# Patient Record
Sex: MALE | Race: WHITE | Employment: FULL TIME | URBAN - METROPOLITAN AREA
[De-identification: names, ages, dates, MRNs, and addresses within clinical notes are randomized per-mention and may not be internally consistent; named-entity substitution may affect disease eponyms.]

---

## 2017-12-08 PROBLEM — E66.01 OBESITY, MORBID (HCC): Status: ACTIVE | Noted: 2017-12-08

## 2018-02-20 ENCOUNTER — HOSPITAL ENCOUNTER (OUTPATIENT)
Dept: LAB | Age: 58
Discharge: HOME | End: 2018-02-20

## 2018-02-20 PROCEDURE — 88305 TISSUE EXAM BY PATHOLOGIST: CPT | Performed by: INTERNAL MEDICINE

## 2018-02-20 PROCEDURE — 88312 SPECIAL STAINS GROUP 1: CPT | Performed by: INTERNAL MEDICINE

## 2020-03-18 ENCOUNTER — HOSPITAL ENCOUNTER (OUTPATIENT)
Dept: PHYSICAL THERAPY | Age: 60
Discharge: HOME | End: 2020-03-18
Attending: INTERNAL MEDICINE
Payer: COMMERCIAL

## 2020-03-18 DIAGNOSIS — M25.551 RIGHT HIP PAIN: ICD-10-CM

## 2020-03-18 PROCEDURE — 97161 PT EVAL LOW COMPLEX 20 MIN: CPT

## 2020-03-18 PROCEDURE — 97110 THERAPEUTIC EXERCISES: CPT

## 2020-03-18 NOTE — PROGRESS NOTES
Leonila Lozano  : 1960  Payor: Taryn Watson / [place] OF [address] / Product Type: PPO /  Therapy Center at [address] [place] [address]  11 [facility], [address] [place] [address], [place] [address] [address] [facility]  [phone]   [phone]      OUTPATIENT PHYSICAL THERAPY: Daily Treatment Note 3/18/2020  Visit Count:  1    ICD-10: Treatment Diagnosis:   M25.551 Pain in Right Hip  M25.651 Stiffness in Right Hip  M62.81 Muscle Weakness Generalized  R26.2 Difficulty in Walking, Not elsewhere classified  Precautions/Allergies:   Patient has no known allergies. Fall Risk Score: 7 (? 5 = High Risk)    TREATMENT PLAN:  Effective Dates: 3/18/2020 TO 2020 (90 days). Frequency/Duration: 2 times a week for 90 Days        PRE-TREATMENT SYMPTOMS/COMPLAINTS:  See Eval    MEDICATIONS REVIEWED:  3/18/2020   TREATMENT:   (In addition to Assessment/Re-Assessment sessions the following treatments were rendered)    THERAPEUTIC EXERCISE: (10 minutes):  Exercises per grid below to improve mobility, strength and balance. Required minimal visual and verbal cues to promote proper body alignment and promote proper body posture. Progressed resistance, range and complexity of movement as indicated. Date:  3/18/2020 Date:   Date:     Activity/Exercise Parameters Parameters Parameters   Piero Stretch [id number]     TFL Stretch [id number]     Piriformis Stretch [id number]     Hamstring Stretch [id number]                         MANUAL THERAPY: (0 minutes): Joint mobilization, Soft tissue mobilization and Manipulation was utilized and necessary because of the patient's restricted joint motion, painful spasm, loss of articular motion and restricted motion of soft tissue.      (Used abbreviations: MET - muscle energy technique; PNF - proprioceptive neuromuscular facilitation; NMR - neuromuscular re-education; AP - anterior to posterior; PA - posterior to anterior)    MODALITIES: (0 minutes):      None today      TREATMENT/SESSION ASSESSMENT:  Adam Ford verbalized understanding of role of PT and POC. Education: All therEx for HEP    RECOMMENDATIONS/INTENT FOR NEXT TREATMENT SESSION: \"Next visit will focus on advancements to more challenging activities\".     PAIN: Initial: 4/10 Post Session:  2/10     [employer] Portal    Total Treatment Billable Duration: 10 min  PT Patient Time In/Time Out  Time In: 1430  Time Out: Ivonne PT, DPT    Future Appointments   Date Time Provider Tashi Greeni   4/13/2020 10:15 AM Lana Murillo MD [facility]

## 2020-03-18 NOTE — THERAPY EVALUATION
Robbie Lucio  : 1960  Payor: Juancarlos Esteves / [place] OF [address] / Product Type: PPO /  Therapy Center at [facility]  [address], [place] [address] [address] [facility]  [phone]   [phone]        OUTPATIENT PHYSICAL THERAPY:Initial Assessment 3/18/2020    ICD-10: Treatment Diagnosis:   M25.551 Pain in Right Hip  M25.651 Stiffness in Right Hip  M62.81 Muscle Weakness Generalized  R26.2 Difficulty in Walking, Not elsewhere classified  Precautions/Allergies:   Patient has no known allergies. Fall Risk Score: 7 (? 5 = High Risk)  MD Orders: Evaluate and treat MEDICAL/REFERRING DIAGNOSIS:  Right hip pain [M25.551]   DATE OF ONSET: 2020  REFERRING PHYSICIAN: Nena Torrez MD  RETURN PHYSICIAN APPOINTMENT: Tbd by patient     ASSESSMENT:  Mr. Pennie Mcgarry has attended 1 physical therapy session including the initial evaluation. Robbie Lucio presents with s/s consistent with increased hip tightness and OA that are referring pain down lateral knee. According to  performance on the LEFS, Robbie Lucio is highly impaired. Pt is having decreased strength, increased pain, decreased ROM and overall dysfunction of RLE resulting from RT hip pain. Recommending skilled PT: manual therapeutic techniques (as appropriate), therapeutic exercises and activities, balance interventions, and a comprehensive home exercise program to address the current impairments, as listed above. Robbie Lucio will benefit from skilled PT (medically necessary) to address above deficits affecting participation in basic ADLs and overall functional tolerance. PROBLEM LIST (Impacting functional limitations):  1. Decreased Strength  2. Decreased ADL/Functional Activities  3. Decreased Transfer Abilities  4. Increased Pain  5. Decreased Flexibility/Joint Mobility  6. Decreased [place] with Home Exercise Program INTERVENTIONS PLANNED:  1. Balance Exercise  2.  Bed Mobility  3. Cold  4. Cryotherapy  5. Electrical Stimulation  6. Family Education  7. Gait Training  8. Heat  9. Home Exercise Program (HEP)  10. Manual Therapy  11. Neuromuscular Re-education/Strengthening  12. Range of Motion (ROM)  13. Therapeutic Activites  14. Therapeutic Exercise/Strengthening  15. Transcutaneous Electrical Nerve Stimulation (TENS)  16. Transfer Training   TREATMENT PLAN:  TREATMENT PLAN:  Effective Dates: 3/18/2020 TO 6/16/2020 (90 days). Frequency/Duration: 2 times a week for 90 Days  GOALS: (Goals have been discussed and agreed upon with patient.)  Discharge Goals: Time Frame: 6 weeks  1. Amberly Gunter will be independent with HEP. 2. Pt will demonstrated 25 deg active RT hip IR in order to aid with care transfers  3. Pt will improve LEFS to 60/80 in order to improve recreational activities  4. Pt will improve worst pain to 2/10 or less in order to return to ambulation  5. Pt will return to all work activities without increased symptoms  Rehabilitation Potential For Stated Goals: Good  Regarding Brendan Landry's therapy, I certify that the treatment plan above will be carried out by a therapist or under their direction. Thank you for this referral,  [facility]     Referring Physician Signature: Ellis Rodriguez MD              Date                    The information in this section was collected on 3/18/2020 (except where otherwise noted). HISTORY:   History of Present Injury/Illness (Reason for Referral):  Late January, pt reports he started having pain in his RT hip that travelled from post hip/ant hip down to knee. Really hurts at night when he is laying down. History of climbing machines and navigating a lot of stairs for work. 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 CC/Primary Concern:        Stiffness and pain that radiates from hip to lateral knee.      Treatment Side: Right    Past Medical History/Comorbidities:   Mr. Moni Harley  has a past medical history of Allergic reaction to poisonous plant (11/7/2013), DJD (degenerative joint disease), GERD (gastroesophageal reflux disease), History of neck surgery, Hyperlipidemia (11/7/2013), Hypertension, Hypogonadism male (11/7/2013), Obesity (11/7/2013), Rheumatoid arthritis(714.0), and Sleep apnea. Mr. Sammie Doyle  has a past surgical history that includes hx other surgical (1998); hx vasectomy (1986); and hx colonoscopy (12/12/2011). Social History/Living Environment:     lives wife, 2 steps to enter home, 8-10 steps in home. Pain/Symptom Location: RT hip and knee     Worst Pain: 8/10  Current Pain: 4/10    Aggravating Factors: Standing, Walking, Stairs Up, Stairs Down, Laying and laying all directions    Alleviating Factors/Positions/Motions: Walking    General Health: Good    Diagnostic Imaging: X-ray    Occupation: full time job doing sales    [facility] Loss: No      Prior Level of Function/Work/Activity:  Driving for sales, climbing     Patient Goals: Sleep better, decrease pain    Current Medications:       Current Outpatient Medications:     nebivoloL (BYSTOLIC) 10 mg tablet, Take 1 Tab by mouth daily. , Disp: 90 Tab, Rfl: 3    meloxicam (MOBIC) 7.5 mg tablet, TAKE 1 TABLET BY MOUTH DAILY, Disp: 30 Tab, Rfl: 1    losartan (COZAAR) 100 mg tablet, Take 1 Tab by mouth daily. , Disp: 90 Tab, Rfl: 3    amLODIPine (NORVASC) 5 mg tablet, Take 1 Tab by mouth daily. , Disp: 90 Tab, Rfl: 3    traZODone (DESYREL) 100 mg tablet, Take 1 Tab by mouth nightly., Disp: 90 Tab, Rfl: 3    doxycycline (MONODOX) 100 mg capsule, Take 1 Cap by mouth two (2) times a day for 15 days. , Disp: 30 Cap, Rfl: 0    mupirocin (BACTROBAN) 2 % ointment, Apply  to affected area daily. , Disp: 22 g, Rfl: 0    multivitamin (ONE A DAY) tablet, Take 1 Tab by mouth daily. , Disp: , Rfl:     OMEPRAZOLE (PRILOSEC PO), Take  by mouth., Disp: , Rfl:    Date Last Reviewed:  3/18/2020       Ambulatory/Rehab Services H2 Model Falls Risk Assessment    Risk Factors:       (1)  Gender [Male]       (5)  History of Recent Falls [w/in 3 months] Ability to Rise from Chair:       (1)  Pushes up, successful in one attempt    Falls Prevention Plan:       No modifications necessary   Total: (5 or greater = High Risk): 7    ©2010 [facility] [place] Patent #[id number].  Federal Law prohibits the replication, distribution or use without written permission from [facility] [employer]        Number of Personal Factors/Comorbidities that affect the Plan of Care: 3+: HIGH COMPLEXITY   EXAMINATION:   Inspection        Patient Consent: Yes        Iliac Crest: Level      PSIS: Level      ASIS: Level  ________________________________________________________________________________________________  Observation        Transfers: Sit to stand: Increased use of BUE, increased use of LLE and Supine to sit: Increased time         Gait: Antalgic, Increase Hike During Swing, Circumduction, Decreased Gait Speed and Decreased Step Length        Assistive Device:        Type: None        Hand Use: N/A        Stairs: NotAssessed        Railing: none            Muscle Guarding: none      Muscle Atrophy: none        Edema: No Edema, Erythema or Ecchymosis noted          Pitting: No        Movement Apprehension: Hip IR    ________________________________________________________________________________________________  Range of Motion        Lower  Joint: Passive Active    Right (Degrees) Right (Degrees)   Hip Flexion 100 (stopped by jeans) 100 (Stopped by jeans)   Hip Internal Rotation 15 10   Hip External Rotation 20 25             Additional Comments: pain with active IR   ________________________________________________________________________________________________  Strength            Lower Extremity  Joint:      RIGHT LEFT   Hip Flexion 5/5 5/5   Hip Extension 4+/5 5/5   Hip Internal Rotation 4+/5 5/5   Hip External Rotation 5/5 5/5 Hip Abduction 5/5 5/5     ________________________________________________________________________________________________    [facility]        C/O Radicular Symptoms: Yes: Pain down lat knee (RT)     LE NEUROLOGICAL SCREEN: All dermatomes, myotomes and reflexes WNL       ________________________________________________________________________________________________  Special Tests      Scour: Positive, BART: Positive  and Long Axis Distraction: Increased radicular pain      ________________________________________________________________________________________________  Palpation: Tenderness at piriformis and Glute in RLE         Body Structures Involved:  7. Joints  8. Muscles  9. Ligaments Body Functions Affected:  17. Sensory/Pain  18. Movement Related Activities and Participation Affected:  1. Mobility  2. Interpersonal Interactions and Relationships  3. Community, Social and [place]   Number of elements (examined above) that affect the Plan of Care: 4+: HIGH COMPLEXITY   CLINICAL PRESENTATION:   Presentation: Stable and uncomplicated: LOW COMPLEXITY   CLINICAL DECISION MAKING:   Outcome Measure: Tool Used: Lower Extremity Functional Scale (LEFS)  Score:  Initial: 32/80 Most Recent: X/80 (Date: -- )   Interpretation of Score: 20 questions each scored on a 5 point scale with 0 representing \"extreme difficulty or unable to perform\" and 4 representing \"no difficulty\". The lower the score, the greater the functional disability. 80/80 represents no disability. Minimal detectable change is 9 points. Medical Necessity:   · Patient is expected to demonstrate progress in strength, range of motion, balance, coordination and functional technique to return to prior level of function. Reason for Services/Other Comments:  · Patient continues to require skilled intervention due to pain, weakness, and decreased ROM impairing work and home ADLs.  .   Use of outcome tool(s) and clinical judgement create a POC that gives a: Clear prediction of patient's progress: LOW COMPLEXITY

## 2020-09-03 NOTE — THERAPY DISCHARGE
Eddie Carrasco  : 1960  Payor: Lillian Harmon / [place] [address] / Product Type: PPO /  Therapy Center at [facility]  [address]", [place] [address] [address] [facility]  [phone]   [phone]        OUTPATIENT PHYSICAL THERAPY:Discontinuation Summary 9/3/2020    ICD-10: Treatment Diagnosis:   M25.551 Pain in Right Hip  M25.651 Stiffness in Right Hip  M62.81 Muscle Weakness Generalized  R26.2 Difficulty in Walking, Not elsewhere classified  Precautions/Allergies:   Patient has no known allergies. Fall Risk Score: 7 (? 5 = High Risk)  MD Orders: Evaluate and treat MEDICAL/REFERRING DIAGNOSIS:  Right hip pain [M25.551]   DATE OF ONSET: 2020  REFERRING PHYSICIAN: Rose Sanchez MD  RETURN PHYSICIAN APPOINTMENT: Tbd by patient     ASSESSMENT:  Mr. Claudette Gilbert has attended 1 physical therapy session including the initial evaluation. Following clinic shutdown pt was attempted to be called and not able to be reached to resume PT. No follow up visits scheduled. Pt to be d/c from PT services at this time. PROBLEM LIST (Impacting functional limitations):  1. Decreased Strength  2. Decreased ADL/Functional Activities  3. Decreased Transfer Abilities  4. Increased Pain  5. Decreased Flexibility/Joint Mobility  6. Decreased [place] with Home Exercise Program INTERVENTIONS PLANNED:  1. Balance Exercise  2. Bed Mobility  3. Cold  4. Cryotherapy  5. Electrical Stimulation  6. Family Education  7. Gait Training  8. Heat  9. Home Exercise Program (HEP)  10. Manual Therapy  11. Neuromuscular Re-education/Strengthening  12. Range of Motion (ROM)  13. Therapeutic Activites  14. Therapeutic Exercise/Strengthening  15. Transcutaneous Electrical Nerve Stimulation (TENS)  16. Transfer Training   TREATMENT PLAN:  TREATMENT PLAN:  Effective Dates: 3/18/2020 TO 2020 (90 days).  Frequency/Duration: 2 times a week for 90 Days  GOALS: (Goals have been discussed and agreed upon with patient.)  Discharge Goals: Time Frame: 6 weeks  1. Vivi Thomson will be independent with HEP. 2. Pt will demonstrated 25 deg active RT hip IR in order to aid with care transfers  3. Pt will improve LEFS to 60/80 in order to improve recreational activities  4. Pt will improve worst pain to 2/10 or less in order to return to ambulation  5. Pt will return to all work activities without increased symptoms    Regarding Deandre Gabriele Landry's therapy, I certify that the treatment plan above will be carried out by a therapist or under their direction. Thank you for this referral,  Victorino Pressley, PT, DPT              The information in this section was collected on 03/18/20 (except where otherwise noted). HISTORY:   History of Present Injury/Illness (Reason for Referral):  Late January, pt reports he started having pain in his RT hip that travelled from post hip/ant hip down to knee. Really hurts at night when he is laying down. History of climbing machines and navigating a lot of stairs for work. 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 CC/Primary Concern:        Stiffness and pain that radiates from hip to lateral knee. Treatment Side: Right    Past Medical History/Comorbidities:   Mr. Claude Lowing  has a past medical history of Allergic reaction to poisonous plant (11/7/2013), DJD (degenerative joint disease), GERD (gastroesophageal reflux disease), History of neck surgery, Hyperlipidemia (11/7/2013), Hypertension, Hypogonadism male (11/7/2013), Obesity (11/7/2013), Rheumatoid arthritis(714.0), and Sleep apnea. Mr. Claude Lowing  has a past surgical history that includes hx other surgical (1998); hx vasectomy (1986); and hx colonoscopy (12/12/2011). Social History/Living Environment:     lives wife, 2 steps to enter home, 8-10 steps in home.      Pain/Symptom Location: RT hip and knee     Worst Pain: 8/10  Current Pain: 4/10    Aggravating Factors: Standing, Walking, Stairs Up, Stairs Down, Laying and laying all directions    Alleviating Factors/Positions/Motions: Walking    General Health: Good    Diagnostic Imaging: X-ray    Occupation: full time job doing sales    [facility] Loss: No      Prior Level of Function/Work/Activity:  Driving for sales, climbing     Patient Goals: Sleep better, decrease pain    Current Medications:       Current Outpatient Medications:     meloxicam (MOBIC) 7.5 mg tablet, TAKE 1 TABLET BY MOUTH DAILY, Disp: 30 Tab, Rfl: 1    fluticasone propionate (FLONASE) 50 mcg/actuation nasal spray, 2 Sprays by Both Nostrils route daily. , Disp: 1 Bottle, Rfl: 0    fexofenadine (ALLEGRA) 180 mg tablet, Take 1 Tab by mouth daily. , Disp: 100 Tab, Rfl: 4    mupirocin calcium (BACTROBAN) 2 % nasal ointment, by Both Nostrils route two (2) times a day., Disp: 1 g, Rfl: 0    chlorhexidine (Hibiclens) 4 % liquid, Use as directed, Disp: 300 mL, Rfl: 1    melatonin 10 mg tab, Take  by mouth., Disp: , Rfl:     MELATONIN PO, Take  by mouth., Disp: , Rfl:     glucosamine/chondr han A sod (OSTEO BI-FLEX PO), Take  by mouth., Disp: , Rfl:     cyanocobalamin, vitamin B-12, (VITAMIN B12 PO), Take  by mouth., Disp: , Rfl:     triamcinolone acetonide (KENALOG) 0.1 % topical cream, Apply  to affected area two (2) times a day. use thin layer, Disp: , Rfl:     clotrimazole-betamethasone (Lotrisone) topical cream, Apply twice daily, Disp: 45 g, Rfl: 0    nebivoloL (BYSTOLIC) 10 mg tablet, Take 1 Tab by mouth daily. , Disp: 90 Tab, Rfl: 3    losartan (COZAAR) 100 mg tablet, Take 1 Tab by mouth daily. , Disp: 90 Tab, Rfl: 3    amLODIPine (NORVASC) 5 mg tablet, Take 1 Tab by mouth daily. , Disp: 90 Tab, Rfl: 3    traZODone (DESYREL) 100 mg tablet, Take 1 Tab by mouth nightly., Disp: 90 Tab, Rfl: 3    mupirocin (BACTROBAN) 2 % ointment, Apply  to affected area daily. , Disp: 22 g, Rfl: 0    multivitamin (ONE A DAY) tablet, Take 1 Tab by mouth daily. , Disp: , Rfl:     OMEPRAZOLE (PRILOSEC PO), Take  by mouth., Disp: , Rfl:    Date Last Reviewed:  3/18/2020       Ambulatory/Rehab Services H2 Model Falls Risk Assessment    Risk Factors:       (1)  Gender [Male]       (5)  History of Recent Falls [w/in 3 months] Ability to Rise from Chair:       (1)  Pushes up, successful in one attempt    Falls Prevention Plan:       No modifications necessary   Total: (5 or greater = High Risk): 7    ©2010 [facility] [place] [address] [place] [address] Patent #[id number]. Federal Law prohibits the replication, distribution or use without written permission from [facility] [employer]        Number of Personal Factors/Comorbidities that affect the Plan of Care: 3+: HIGH COMPLEXITY   EXAMINATION:   Body Structures Involved:  7. Joints  8. Muscles  9. Ligaments Body Functions Affected:  17. Sensory/Pain  18. Movement Related Activities and Participation Affected:  1. Mobility  2. Interpersonal Interactions and Relationships  3. Community, Social and [place]   Number of elements (examined above) that affect the Plan of Care: 4+: HIGH COMPLEXITY   CLINICAL PRESENTATION:   Presentation: Stable and uncomplicated: LOW COMPLEXITY   CLINICAL DECISION MAKING:   Outcome Measure: Tool Used: Lower Extremity Functional Scale (LEFS)  Score:  Initial: 32/80 Most Recent: X/80 (Date: -- )   Interpretation of Score: 20 questions each scored on a 5 point scale with 0 representing \"extreme difficulty or unable to perform\" and 4 representing \"no difficulty\". The lower the score, the greater the functional disability. 80/80 represents no disability. Minimal detectable change is 9 points.       ·    Use of outcome tool(s) and clinical judgement create a POC that gives a: Clear prediction of patient's progress: LOW COMPLEXITY

## 2024-03-06 ENCOUNTER — APPOINTMENT (RX ONLY)
Dept: URBAN - METROPOLITAN AREA CLINIC 25 | Facility: CLINIC | Age: 64
Setting detail: DERMATOLOGY
End: 2024-03-06

## 2024-03-06 DIAGNOSIS — D17 BENIGN LIPOMATOUS NEOPLASM: ICD-10-CM

## 2024-03-06 DIAGNOSIS — D485 NEOPLASM OF UNCERTAIN BEHAVIOR OF SKIN: ICD-10-CM

## 2024-03-06 DIAGNOSIS — L72.8 OTHER FOLLICULAR CYSTS OF THE SKIN AND SUBCUTANEOUS TISSUE: ICD-10-CM

## 2024-03-06 PROBLEM — D48.5 NEOPLASM OF UNCERTAIN BEHAVIOR OF SKIN: Status: ACTIVE | Noted: 2024-03-06

## 2024-03-06 PROBLEM — D17.1 BENIGN LIPOMATOUS NEOPLASM OF SKIN AND SUBCUTANEOUS TISSUE OF TRUNK: Status: ACTIVE | Noted: 2024-03-06

## 2024-03-06 PROCEDURE — ? BIOPSY BY SHAVE METHOD

## 2024-03-06 PROCEDURE — ? REFERRAL

## 2024-03-06 PROCEDURE — ? OTHER

## 2024-03-06 PROCEDURE — 11102 TANGNTL BX SKIN SINGLE LES: CPT

## 2024-03-06 PROCEDURE — ? COUNSELING

## 2024-03-06 PROCEDURE — 99202 OFFICE O/P NEW SF 15 MIN: CPT | Mod: 25

## 2024-03-06 ASSESSMENT — LOCATION SIMPLE DESCRIPTION DERM
LOCATION SIMPLE: RIGHT UPPER BACK
LOCATION SIMPLE: RIGHT FOREHEAD

## 2024-03-06 ASSESSMENT — LOCATION ZONE DERM
LOCATION ZONE: FACE
LOCATION ZONE: TRUNK

## 2024-03-06 ASSESSMENT — LOCATION DETAILED DESCRIPTION DERM
LOCATION DETAILED: RIGHT MID-UPPER BACK
LOCATION DETAILED: RIGHT SUPERIOR UPPER BACK
LOCATION DETAILED: RIGHT INFERIOR FOREHEAD

## 2024-03-06 NOTE — PROCEDURE: OTHER
Detail Level: Zone
Render Risk Assessment In Note?: no
Note Text (......Xxx Chief Complaint.): This diagnosis correlates with the
Other (Free Text): Will send referral to general surgery if patient desires removal.

## 2025-03-06 ENCOUNTER — APPOINTMENT (OUTPATIENT)
Dept: URBAN - METROPOLITAN AREA CLINIC 25 | Facility: CLINIC | Age: 65
Setting detail: DERMATOLOGY
End: 2025-03-06

## 2025-03-06 DIAGNOSIS — L57.8 OTHER SKIN CHANGES DUE TO CHRONIC EXPOSURE TO NONIONIZING RADIATION: ICD-10-CM

## 2025-03-06 DIAGNOSIS — D22 MELANOCYTIC NEVI: ICD-10-CM

## 2025-03-06 DIAGNOSIS — L82.1 OTHER SEBORRHEIC KERATOSIS: ICD-10-CM

## 2025-03-06 DIAGNOSIS — L30.8 OTHER SPECIFIED DERMATITIS: ICD-10-CM

## 2025-03-06 DIAGNOSIS — L30.0 NUMMULAR DERMATITIS: ICD-10-CM

## 2025-03-06 PROBLEM — D22.5 MELANOCYTIC NEVI OF TRUNK: Status: ACTIVE | Noted: 2025-03-06

## 2025-03-06 PROCEDURE — ? COUNSELING

## 2025-03-06 PROCEDURE — ? PRESCRIPTION MEDICATION MANAGEMENT

## 2025-03-06 PROCEDURE — 99213 OFFICE O/P EST LOW 20 MIN: CPT

## 2025-03-06 PROCEDURE — ? SUNSCREEN RECOMMENDATIONS

## 2025-03-06 ASSESSMENT — LOCATION SIMPLE DESCRIPTION DERM
LOCATION SIMPLE: RIGHT THIGH
LOCATION SIMPLE: RIGHT UPPER BACK
LOCATION SIMPLE: LEFT CHEEK

## 2025-03-06 ASSESSMENT — LOCATION DETAILED DESCRIPTION DERM
LOCATION DETAILED: RIGHT MEDIAL UPPER BACK
LOCATION DETAILED: RIGHT INFERIOR MEDIAL UPPER BACK
LOCATION DETAILED: RIGHT ANTERIOR LATERAL PROXIMAL THIGH
LOCATION DETAILED: RIGHT SUPERIOR UPPER BACK
LOCATION DETAILED: LEFT INFERIOR CENTRAL MALAR CHEEK

## 2025-03-06 ASSESSMENT — LOCATION ZONE DERM
LOCATION ZONE: FACE
LOCATION ZONE: LEG
LOCATION ZONE: TRUNK

## 2025-03-06 NOTE — PROCEDURE: SUNSCREEN RECOMMENDATIONS
Products Recommended: All sunscreens sold over the counter are FDA-approved and will work well if applied as described above. The following are products that I like for various areas of the body:\\nFace:\\n- Curated Protect SPF40 sunscreen with CE Ferulic\\n- SkinCeuticals Physical Fusion UV Defense SPF 50\\n- EltaMD UV Clear Broad-Spectrum SPF 46\\n- Revision Intellishade SPF 45\\n\\nBody: \\n- EltaMD UV Spray Broad-Spectrum SPF 46\\n- EltaMD UV Pure Broad-Spectrum SPF 47\\n- EltaMD UV Sport Broad-Spectrum SPF 50\\n- Neutrogena Ultra Sheer Face & Body Sunscreen Stick SPF 70\\n- Neutrogena Sheer Zinc Broad-Spectrum SPF 50\\n\\nLips:\\n- Aquaphor Lip Protectant + Sunscreen SPF 30\\n- EltaMD UV Lip [place] Broad-Spectrum SPF 36\\n\\nScalp:\\n- Supergoop Poof Part Powder SPF 50
Detail Level: Detailed
General Sunscreen Counseling: I recommended a broad spectrum sunscreen with a SPF of 30 or higher.  I explained that SPF 30 sunscreens block approximately 97 percent of the sun's harmful rays.  Sunscreens should be applied at least 15 minutes prior to expected sun exposure and then every 2 hours after that as long as sun exposure continues. If swimming or exercising sunscreen should be reapplied every 45 minutes to an hour after getting wet or sweating.  One ounce, or the equivalent of a shot glass full of sunscreen, is adequate to protect the skin not covered by a bathing suit. I also recommended a lip balm with a sunscreen as well. Sun protective clothing can be used in lieu of sunscreen but must be worn the entire time you are exposed to the sun's rays.

## 2025-03-06 NOTE — PROCEDURE: PRESCRIPTION MEDICATION MANAGEMENT
Render In Strict Bullet Format?: No
Continue Regimen: Lotrisone cream twice daily (prescribed after pt has hip surgery due to area being shaved)
Detail Level: Zone